# Patient Record
Sex: MALE | Race: WHITE
[De-identification: names, ages, dates, MRNs, and addresses within clinical notes are randomized per-mention and may not be internally consistent; named-entity substitution may affect disease eponyms.]

---

## 2018-12-02 ENCOUNTER — HOSPITAL ENCOUNTER (EMERGENCY)
Dept: HOSPITAL 61 - ER | Age: 38
Discharge: HOME | End: 2018-12-02
Payer: COMMERCIAL

## 2018-12-02 VITALS — HEIGHT: 68 IN | WEIGHT: 220 LBS | BODY MASS INDEX: 33.34 KG/M2

## 2018-12-02 VITALS — SYSTOLIC BLOOD PRESSURE: 131 MMHG | DIASTOLIC BLOOD PRESSURE: 67 MMHG
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
## 2018-12-02 DIAGNOSIS — S60.221A: ICD-10-CM
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
## 2018-12-02 DIAGNOSIS — E03.9: ICD-10-CM

## 2018-12-02 DIAGNOSIS — S50.11XA: Primary | ICD-10-CM

## 2018-12-02 DIAGNOSIS — S60.211A: ICD-10-CM
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
## 2018-12-02 PROCEDURE — 73130 X-RAY EXAM OF HAND: CPT

## 2018-12-02 PROCEDURE — 73110 X-RAY EXAM OF WRIST: CPT

## 2018-12-02 PROCEDURE — 73090 X-RAY EXAM OF FOREARM: CPT

## 2018-12-02 PROCEDURE — 29125 APPL SHORT ARM SPLINT STATIC: CPT

## 2018-12-02 RX ADMIN — HYDROCODONE BITARTRATE AND ACETAMINOPHEN ONE TAB: 5; 325 TABLET ORAL at 16:06

## 2018-12-02 RX ADMIN — IBUPROFEN ONE MG: 600 TABLET ORAL at 16:05

## 2018-12-02 NOTE — RAD
Examination: 3 views of the right hand, 3 views right wrist and 2 views of

the right forearm were performed

 

 

 

HISTORY: Injury

 

COMPARISON: None available

 

FINDINGS:

 

The alignment of the radius, ulna grossly appears unremarkable. The 

alignment of the carpal bones, carpal metacarpal joints, 

metacarpophalangeal joints, interphalangeal joints grossly appears 

unremarkable. No acute fracture identified.

 

IMPRESSION:

 

No acute osseous findings.

 

Electronically signed by: Emmanuel Kasper MD (12/2/2018 4:35 PM) [facility]

## 2018-12-02 NOTE — RAD
Examination: 3 views of the right hand, 3 views right wrist and 2 views of

the right forearm were performed

 

 

 

HISTORY: Injury

 

COMPARISON: None available

 

FINDINGS:

 

The alignment of the radius, ulna grossly appears unremarkable. The 

alignment of the carpal bones, carpal metacarpal joints, 

metacarpophalangeal joints, interphalangeal joints grossly appears 

unremarkable. No acute fracture identified.

 

IMPRESSION:

 

No acute osseous findings.

 

Electronically signed by: Emmanuel Kasper MD (12/2/2018 4:35 PM) [facility]

## 2018-12-02 NOTE — RAD
Examination: 3 views of the right hand, 3 views right wrist and 2 views of

the right forearm were performed

 

 

 

HISTORY: Injury

 

COMPARISON: None available

 

FINDINGS:

 

The alignment of the radius, ulna grossly appears unremarkable. The 

alignment of the carpal bones, carpal metacarpal joints, 

metacarpophalangeal joints, interphalangeal joints grossly appears 

unremarkable. No acute fracture identified.

 

IMPRESSION:

 

No acute osseous findings.

 

Electronically signed by: Emmanuel Kasper MD (12/2/2018 4:35 PM) [facility]

## 2018-12-02 NOTE — PHYS DOC
Past Medical History


Past Medical History:  Hypothyroid


Past Surgical History:  Other


Additional Past Surgical Histo:  hernia repair


Alcohol Use:  None


Drug Use:  None





Adult General


Chief Complaint


Chief Complaint:  UPPER EXTREMITY PAIN





HPI


HPI





Patient is a 38  year old [f__sex] who presents with []





Review of Systems


Review of Systems





Constitutional: Denies fever or chills []


Eyes: Denies change in visual acuity, redness, or eye pain []


HENT: Denies nasal congestion or sore throat []


Respiratory: Denies cough or shortness of breath []


Cardiovascular: No additional information not addressed in HPI []


GI: Denies abdominal pain, nausea, vomiting, bloody stools or diarrhea []


: Denies dysuria or hematuria []


Musculoskeletal: Denies back pain or joint pain []


Integument: Denies rash or skin lesions []


Neurologic: Denies headache, focal weakness or sensory changes []


Endocrine: Denies polyuria or polydipsia []





All other systems were reviewed and found to be within normal limits, except as 

documented in this note.





Current Medications


Current Medications





Current Medications








 Medications


  (Trade)  Dose


 Ordered  Sig/Jesús  Start Time


 Stop Time Status Last Admin


Dose Admin


 


 Acetaminophen/


 Hydrocodone Bitart


  (Lortab 5/325)  1 tab  1X  ONCE  12/2/18 16:00


 12/2/18 16:01 DC 12/2/18 16:06


1 TAB


 


 Ibuprofen


  (Motrin)  600 mg  1X  ONCE  12/2/18 16:00


 12/2/18 16:01 DC 12/2/18 16:05


600 MG











Allergies


Allergies





Allergies








Coded Allergies Type Severity Reaction Last Updated Verified


 


  No Known Drug Allergies    9/6/14 No











Physical Exam


Physical Exam





Constitutional: Well developed, well nourished, no acute distress, non-toxic 

appearance. []


HENT: Normocephalic, atraumatic, bilateral external ears normal, oropharynx 

moist, no oral exudates, nose normal. []


Eyes: PERRLA, EOMI, conjunctiva normal, no discharge. [] 


Neck: Normal range of motion, no tenderness, supple, no stridor. [] 


Cardiovascular:Heart rate regular rhythm, no murmur []


Lungs & Thorax:  Bilateral breath sounds clear to auscultation []


Abdomen: Bowel sounds normal, soft, no tenderness, no masses, no pulsatile 

masses. [] 


Skin: Warm, dry, no erythema, no rash. [] 


Back: No tenderness, no CVA tenderness. [] 


Extremities: No tenderness, no cyanosis, no clubbing, ROM intact, no edema. [] 


Neurologic: Alert and oriented X 3, normal motor function, normal sensory 

function, no focal deficits noted. []


Psychologic: Affect normal, judgement normal, mood normal. []





Current Patient Data


Vital Signs





 Vital Signs








  Date Time  Temp Pulse Resp B/P (MAP) Pulse Ox O2 Delivery O2 Flow Rate FiO2


 


12/2/18 15:50 98.0 93 20 131/67 (88) 98 Room Air  





 98.0       











EKG


EKG


[]





Radiology/Procedures


Radiology/Procedures


PROCEDURE: WRIST 3V RIGHT





Examination: 3 views of the right hand, 3 views right wrist and 2 views of


the right forearm were performed


 


 


 


HISTORY: Injury


 


COMPARISON: None available


 


FINDINGS:


 


The alignment of the radius, ulna grossly appears unremarkable. The 


alignment of the carpal bones, carpal metacarpal joints, 


metacarpophalangeal joints, interphalangeal joints grossly appears 


unremarkable. No acute fracture identified.


 


IMPRESSION:


 


No acute osseous findings.


 


Electronically signed by: Emmanuel Kasper MD (12/2/2018 4:35 PM) [facility] [place]














DICTATED and SIGNED BY:     EMMANUEL KASPER MD


DATE:     12/02/18 1632








PROCEDURE: HAND RIGHT 3V





Examination: 3 views of the right hand, 3 views right wrist and 2 views of


the right forearm were performed


 


 


 


HISTORY: Injury


 


COMPARISON: None available


 


FINDINGS:


 


The alignment of the radius, ulna grossly appears unremarkable. The 


alignment of the carpal bones, carpal metacarpal joints, 


metacarpophalangeal joints, interphalangeal joints grossly appears 


unremarkable. No acute fracture identified.


 


IMPRESSION:


 


No acute osseous findings.


 


Electronically signed by: Emmanuel Kasper MD (12/2/2018 4:35 PM) [facility] [place]














DICTATED and SIGNED BY:     EMMANUEL KASPER MD


DATE:     12/02/18 1632








PROCEDURE: FOREARM RIGHT





Examination: 3 views of the right hand, 3 views right wrist and 2 views of


the right forearm were performed


 


 


 


HISTORY: Injury


 


COMPARISON: None available


 


FINDINGS:


 


The alignment of the radius, ulna grossly appears unremarkable. The 


alignment of the carpal bones, carpal metacarpal joints, 


metacarpophalangeal joints, interphalangeal joints grossly appears 


unremarkable. No acute fracture identified.


 


IMPRESSION:


 


No acute osseous findings.


 


Electronically signed by: Emmanuel Kasper MD (12/2/2018 4:35 PM) [facility] [place]














DICTATED and SIGNED BY:     EMMANUEL KASPER MD


DATE:     12/02/18 1632





Course & Med Decision Making


Course & Med Decision Making


Pertinent Imaging studies reviewed. (See chart for details)





1650: Discussed xray results with pt with no findings of acute fxs/injury. On 

reexam patient remains neuro and vascular intact in right upper extremity with 

range of motion intact in fingers/hand/wrist/elbow. Radial 2+ brisk capillary 

refill. Will have wound cleansed and dressing applied to abrasions to right 

hand and forearm. Discussed plans for Ace wrap to hand/wrist/forearm and sling 

offered however patient feels he will not need the sling. Pt was given dose of 

Ibuprofen/Norco and ice pack. Will provide Rx for [place] for home with education 

on use of NSAids. RICE acronym discussed. Discussed if sxs persist he should f/

u with orthopedic doctor for further care/evaluation. Home wound care was 

discussed. Pt was UTD on tetanus w/in past 5 yrs. time of discharge patient is 

in no visible distress. Discharge instructions were discussed and education 

provided on signs and symptoms to return to ER for. Will provide orthopedic 

referral information on discharge paperwork.





Dragon Disclaimer


Dragon Disclaimer


This electronic medical record was generated, in whole or in part, using a 

voice recognition dictation system.





Departure


Departure


Impression:  


 Primary Impression:  


 Injury of wrist, right


 Additional Impressions:  


 Injury of hand, right


 Right forearm injury


 Abrasion


 Contusion


Disposition:  01 HOME, SELF-CARE


Condition:  STABLE


Referrals:  


NO PCP (PCP)








LEN ROWLEY II, MD


orthopedic doctor


Patient Instructions:  Abrasions, Contusion, Elastic Bandage and RICE, Hand 

Contusion





Additional Instructions:  


Ibuprofen as directed on container as needed for pain.  Tylenol can be taken if 

not taking the prescribed Norco medication.





If symptoms persist follow-up with orthopedic doctor for further evaluation/

care.





Monitor wounds for signs of infection- follow up with primary doctor with any 

concerns.


Scripts


Hydrocodone/Apap 5-325 (NORCO 5-325 TABLET) 1 Each Tablet


1 TAB PO PRN Q6HRS PRN for PAIN, #10 TAB 0 Refills


   Prov: MARYCHUY ALMEIDA         12/2/18





Problem Qualifiers











MARYCHUY ALMEIDA Dec 2, 2018 17:09